# Patient Record
Sex: FEMALE | Race: WHITE | HISPANIC OR LATINO | Employment: UNEMPLOYED | URBAN - METROPOLITAN AREA
[De-identification: names, ages, dates, MRNs, and addresses within clinical notes are randomized per-mention and may not be internally consistent; named-entity substitution may affect disease eponyms.]

---

## 2017-05-24 ENCOUNTER — APPOINTMENT (OUTPATIENT)
Dept: RADIOLOGY | Facility: MEDICAL CENTER | Age: 27
End: 2017-05-24
Attending: EMERGENCY MEDICINE
Payer: COMMERCIAL

## 2017-05-24 ENCOUNTER — HOSPITAL ENCOUNTER (EMERGENCY)
Facility: MEDICAL CENTER | Age: 27
End: 2017-05-24
Attending: EMERGENCY MEDICINE
Payer: COMMERCIAL

## 2017-05-24 VITALS
DIASTOLIC BLOOD PRESSURE: 59 MMHG | BODY MASS INDEX: 26.88 KG/M2 | WEIGHT: 136.91 LBS | TEMPERATURE: 97.9 F | OXYGEN SATURATION: 98 % | RESPIRATION RATE: 18 BRPM | SYSTOLIC BLOOD PRESSURE: 109 MMHG | HEIGHT: 60 IN | HEART RATE: 68 BPM

## 2017-05-24 DIAGNOSIS — S16.1XXA CERVICAL STRAIN, INITIAL ENCOUNTER: ICD-10-CM
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
## 2017-05-24 DIAGNOSIS — S40.011A CONTUSION OF RIGHT SHOULDER, INITIAL ENCOUNTER: ICD-10-CM

## 2017-05-24 DIAGNOSIS — R07.89 CHEST WALL PAIN: ICD-10-CM

## 2017-05-24 PROCEDURE — 70450 CT HEAD/BRAIN W/O DYE: CPT

## 2017-05-24 PROCEDURE — 700102 HCHG RX REV CODE 250 W/ 637 OVERRIDE(OP): Performed by: EMERGENCY MEDICINE

## 2017-05-24 PROCEDURE — 700117 HCHG RX CONTRAST REV CODE 255: Performed by: EMERGENCY MEDICINE

## 2017-05-24 PROCEDURE — 99284 EMERGENCY DEPT VISIT MOD MDM: CPT

## 2017-05-24 PROCEDURE — 72125 CT NECK SPINE W/O DYE: CPT

## 2017-05-24 PROCEDURE — A9270 NON-COVERED ITEM OR SERVICE: HCPCS | Performed by: EMERGENCY MEDICINE

## 2017-05-24 PROCEDURE — 73030 X-RAY EXAM OF SHOULDER: CPT | Mod: RT

## 2017-05-24 PROCEDURE — 71260 CT THORAX DX C+: CPT

## 2017-05-24 RX ORDER — HYDROCODONE BITARTRATE AND ACETAMINOPHEN 5; 325 MG/1; MG/1
1 TABLET ORAL EVERY 4 HOURS PRN
Qty: 14 TAB | Refills: 0 | Status: SHIPPED | OUTPATIENT
Start: 2017-05-24

## 2017-05-24 RX ORDER — HYDROCODONE BITARTRATE AND ACETAMINOPHEN 10; 325 MG/1; MG/1
1 TABLET ORAL ONCE
Status: COMPLETED | OUTPATIENT
Start: 2017-05-24 | End: 2017-05-24

## 2017-05-24 RX ADMIN — HYDROCODONE BITARTRATE AND ACETAMINOPHEN 1 TABLET: 10; 325 TABLET ORAL at 19:21

## 2017-05-24 RX ADMIN — IOHEXOL 80 ML: 350 INJECTION, SOLUTION INTRAVENOUS at 20:21

## 2017-05-24 ASSESSMENT — PAIN SCALES - GENERAL
PAINLEVEL_OUTOF10: 7
PAINLEVEL_OUTOF10: 3

## 2017-05-24 NOTE — ED AVS SNAPSHOT
5/24/2017    Brandy Lewis  [place] [address]    Dear Brandy:    [facility] wants to ensure your discharge home is safe and you or your loved ones have had all of your questions answered regarding your care after you leave the hospital.    Below is a list of resources and contact information should you have any questions regarding your hospital stay, follow-up instructions, or active medical symptoms.    Questions or Concerns Regarding… Contact   Medical Questions Related to Your Discharge  (7 days a week, 8am-5pm) Contact a Nurse Care Coordinator   [phone]   Medical Questions Not Related to Your Discharge  (24 hours a day / 7 days a week)  Contact the Nurse Health Line   [phone]    Medications or Discharge Instructions Refer to your discharge packet   or contact your [facility] Primary Care Provider   [phone]   Follow-up Appointment(s) Schedule your appointment via [employer]   or contact Scheduling [phone]   Billing Review your statement via [employer]  or contact Billing [phone]   Medical Records Review your records via [employer]   or contact Medical Records [phone]     You may receive a telephone call within two days of discharge. This call is to make certain you understand your discharge instructions and have the opportunity to have any questions answered. You can also easily access your medical information, test results and upcoming appointments via the [employer] free online health management tool. You can learn more and sign up at [employer]. For assistance setting up your [employer] account, please call [phone].    Once again, we want to ensure your discharge home is safe and that you have a clear understanding of any next steps in your care. If you have any questions or concerns, please do not hesitate to contact us, we are here for you. Thank you for choosing [facility] for your healthcare needs.    Sincerely,    Your [facility] Healthcare Team

## 2017-05-24 NOTE — ED AVS SNAPSHOT
OneShield Access Code: [email or web address]  Expires: 6/23/2017  9:12 PM    Your email address is not on file at [facility].  Email Addresses are required for you to sign up for OneShield, please contact [phone] to verify your personal information and to provide your email address prior to attempting to register for OneShield.    Brandy Lewis  [facility] [place] [address]    OneShield  A secure, online tool to manage your health information     [facility]’s OneShield® is a secure, online tool that connects you to your personalized health information from the privacy of your home -- day or night - making it very easy for you to manage your healthcare. Once the activation process is completed, you can even access your medical information using the OneShield harpreet, which is available for free in the [employer] Harpreet store or Google Play store.     To learn more about OneShield, visit [email or web address]    There are two levels of access available (as shown below):   My Chart Features  [facility] Primary Care Doctor [facility]  Specialists [facility]  Urgent  Care Non-[facility] Primary Care Doctor   Email your healthcare team securely and privately 24/7 X X X    Manage appointments: schedule your next appointment; view details of past/upcoming appointments X      Request prescription refills. X      View recent personal medical records, including lab and immunizations X X X X   View health record, including health history, allergies, medications X X X X   Read reports about your outpatient visits, procedures, consult and ER notes X X X X   See your discharge summary, which is a recap of your hospital and/or ER visit that includes your diagnosis, lab results, and care plan X X  X     How to register for [employer]:  Once your e-mail address has been verified, follow the following steps to sign up for [employer].     1. Go to  [email or web address]  2. Click on the Sign Up Now box, which takes you to the New Member Sign Up page. You  will need to provide the following information:  a. Enter your [employer] Access Code exactly as it appears at the top of this page. (You will not need to use this code after you’ve completed the sign-up process. If you do not sign up before the expiration date, you must request a new code.)   b. Enter your date of birth.   c. Enter your home email address.   d. Click Submit, and follow the next screen’s instructions.  3. Create a [email or web address] ID. This will be your [employer] login ID and cannot be changed, so think of one that is secure and easy to remember.  4. Create a [employer] password. You can change your password at any time.  5. Enter your Password Reset Question and Answer. This can be used at a later time if you forget your password.   6. Enter your e-mail address. This allows you to receive e-mail notifications when new information is available in [employer].  7. Click Sign Up. You can now view your health information.    For assistance activating your [email or web address] account, call [phone]
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
## 2017-05-25 NOTE — DISCHARGE INSTRUCTIONS
Distensión cervical  (Cervical Sprain)  Jackeline distensión cervical es jackeline lesión en el sweta, en la que los tejidos obey y fibrosos (ligamentos) que unen los huesos del sweta, se distienden o se rompen. Jackeline distensión cervical puede ser desde muy leve a muy grave. En los casos graves pueden hacer que las vértebras del sweta se vuelvan inestables. [place] puede causar un daño en la médula sanders y puede josse lugar a graves problemas del sistema nervioso. La cantidad de tiempo que demora la mejoría de jackeline distensión cervical depende de la causa y de la extensión de la lesión. La mayoría de las veces se ramiro en 1 a 3 semanas.  CAUSAS   Las distensiones graves pueden ser causadas por:   · Lesiones por deportes de contacto (mayco en el fútbol americano, rugby, kristal, hockey, automovilismo, gimnasia, buceo, artes marciales y boxeo).  · Colisiones en vehículos de motor.  · Lesiones de latigazo cervical. Esta es jackeline lesión por movimiento brusco de adelante hacia atrás de la aris y el sweta.  · Caídas.  La causa de las distensiones cervicales leves pueden ser:   · Adoptar posiciones incómodas, mayco sostener el teléfono entre la oreja y el hombro.  · Sentarse en jackeline silla que no ofrece el soporte adecuado.  · Trabajar en jackeline toledo de computadora mal diseñada.  · Las actividades que requieren mirar hacia arriba o hacia abajo libby largos períodos.  SÍNTOMAS   · Dolor, sensibilidad, rigidez, o sensación de ardor en la parte anterior, posterior o lateral del sweta. [place] malestar puede aparecer inmediatamente después de la lesión o puede desarrollarse lentamente y no empezar hasta 24 horas o más después de la lesión.  · Dolor o sensibilidad que se siente directamente en la parte media posterior del sweta.  · Dolor en el hombro o la miranda superior de la espalda.  · Capacidad limitada para  el sweta.  · Dolor de aris.  · Mareos.  · Debilidad, entumecimiento u hormigueo en las honorio o los brazos.  · Espasmos  musculares.  · Dificultades para tragar o comer.  · Sensibilidad e hinchazón en el sweta.  DIAGNÓSTICO   La mayoría de las veces, el médico puede diagnosticar jin problema mediante la historia clínica y un examen físico. Manzo médico le preguntará acerca de lesiones previas y problemas conocidos mayco artritis en el sweta. Podrán tomarle radiografías para determinar si hay otros problemas, mayco enfermedades en los huesos del sweta. También puede ser necesario realizar otras pruebas, mayco tomografías computadas o resonancia magnética.   TRATAMIENTO   El tratamiento depende de la gravedad de la distensión. Las distensiones leves se pueden tratar con reposo, manteniendo el sweta en manzo lugar (inmovilización) y usando medicamentos para el dolor. Las distensiones graves deben ser inmediatamente inmovilizadas. Será necesario completar el tratamiento para aliviar el dolor, los espasmos musculares y otros síntomas, y puede incluir.  · Medicamentos mayco calmantes para el dolor, anestésicos o relajantes musculares.  · Fisioterapia. [place] puede incluir ejercicios de elongación, fortalecimiento y entrenamiento de la postura. Los ejercicios y jackeline mejor postura pueden ayudar a estabilizar el sweta, fortalecer los músculos y evitar que los síntomas vuelvan a aparecer.  INSTRUCCIONES PARA EL CUIDADO EN EL HOGAR   · Aplique hielo sobre la miranda lesionada.  ¨ Ponga el hielo en jackeline bolsa plástica.  ¨ Colóquese jackeline toalla entre la piel y la bolsa de hielo.  ¨ Deje el hielo libby 15 - 20 minutos y aplíquelo 3 - 4 veces por día.  · Si la lesión fue grave, le indicarán el uso de un collarín cervical. El collarín cervical es un collar de dos piezas para impedir que el sweta se mueva mientras se ramiro.  ¨ Nose quite el collarín excepto que se lo indique manzo médico.  ¨ Si tiene el gema leticia, manténgalo fuera del collarín.  ¨ Consulte a manzo médico antes de hacerle ajustes. Los ajustes menores pueden ser requeridos con el tiempo para  mejorar el confort y reducir la presión sobre la barbilla o en la parte posterior de la aris.  ¨ Si le permiten quitarse el collarín para lavarlo o darse un baño, siga las indicaciones de manzo médico acerca de cómo hacerlo con seguridad.  ¨ Mantenga el collarín limpio pasando un paño con agua y jabón y secándolo rachelle. Si el collarín tiene almohadillas removibles, quítelas cada 1-2 días para lavarlas a mano con agua y jabón. Deje que se sequen al aire. Debe secarlas rachelle antes de volver a colocarlas en el collarín.  ¨ Si le permiten quitarse el collarín para lavarlo y darse un baño, lave y seque la piel del sweta. Controle manzo piel para detectar irritación o llagas. Si las tiene, informe a manzo médico.  ¨ No conduzca vehículos mientras usa el collarín.  · Sólo tome medicamentos de venta pedro pablo o recetados para calmar el dolor, el malestar o bajar la fiebre, según las indicaciones de manzo médico.  · Cumpla con todas las visitas de control, según le indique manzo médico.  · Cumpla con todas las sesiones de fisioterapia, según le indique manzo médico.  · Francisco los ajustes necesarios en manzo lugar de trabajo para favorecer jackeline buena postura.  · Evite las posiciones y actividades que empeoran los síntomas.  · Francisco precalentamiento y elongue antes de comenzar jackeline actividad para evitar problemas.  SOLICITE ATENCIÓN MÉDICA SI:   · El dolor no se suraj con los medicamentos.  · No puede disminuir la dosis de analgésicos según lo planificado.  · Manzo nivel de actividad no mejora según lo esperado.  SOLICITE ATENCIÓN MÉDICA DE INMEDIATO SI:   · Presenta cualquier hemorragia.  · Siente malestar estomacal.  · Tiene signos de reacción alérgica a los medicamentos.  · Los síntomas empeoran.  · Le aparecen síntomas nuevos e inexplicables.  · Siente adormecimiento, hormigueo, debilidad o parálisis en alguna parte del cuerpo.  ASEGÚRESE DE QUE:   · Comprende estas instrucciones.  · Controlará manzo afección.  · Recibirá ayuda de inmediato si no mejora o  si empeora.     Esta información no tiene mayco fin reemplazar el consejo del médico. Asegúrese de hacerle al médico cualquier pregunta que tenga.     Document Released: 03/16/2010 Document Revised: 10/08/2014  Elsevier Interactive Patient Education ©2016 [employer]    Contusión  (Contusion)  Jackeline contusión es un hematoma profundo. Las contusiones son el resultado de jackeline lesión que causa sangrado debajo de la piel. La miranda de la contusión puede ponerse paige, morada o amarilla. Las lesiones menores causarán contusiones sin dolor, severiano las más graves pueden presentar dolor e inflamación libby un par de semanas.   CAUSAS   Generalmente, jackeline contusión se debe a un golpe, un traumatismo o jackeline fuerza directa en jackeline miranda del cuerpo.  SÍNTOMAS   · Hinchazón y enrojecimiento en la miranda de la lesión.  · Hematomas en la miranda de la lesión.  · Dolor con la palpación y sensibilidad en la miranda de la lesión.  · Dolor.  DIAGNÓSTICO   Se puede establecer el diagnóstico al hacer jackeline historia clínica y un examen físico. Agapito vez sea necesario hacer jackeline radiografía, jackeline tomografía computarizada o jackeline resonancia magnética para determinar si hay lesiones asociadas, mayco fracturas.  TRATAMIENTO   El tratamiento específico dependerá de la miranda del cuerpo donde se produjo la lesión. En general, el mejor tratamiento para jackeline contusión es el reposo, la aplicación de hielo, la elevación de la miranda y la aplicación de compresas frías en la miranda de la lesión. Para calmar el dolor también podrán recomendarle medicamentos de venta pedro pablo. Pregúntele al médico cuál es el mejor tratamiento para manzo contusión.  INSTRUCCIONES PARA EL CUIDADO EN EL HOGAR   · Aplique hielo sobre la miranda lesionada.  ¨ Ponga el hielo en jackeline bolsa plástica.  ¨ Colóquese jackeline toalla entre la piel y la bolsa de hielo.  ¨ Deje el hielo libby 15 a 20 minutos, 3 a 4 veces por día, o según las indicaciones del médico.  · Utilice los medicamentos de venta pedro pablo o recetados para  calmar el dolor, el malestar o la fiebre, según se lo indique el médico. El médico podrá indicarle que evite hardy antiinflamatorios (aspirina, ibuprofeno y naproxeno) libby 48 horas ya que estos medicamentos pueden aumentar los hematomas.  · Mantenga la miranda de la lesión en reposo.  · Si es posible, eleve la miranda de la lesión para reducir la hinchazón.  SOLICITE ATENCIÓN MÉDICA DE INMEDIATO SI:   · El hematoma o la hinchazón aumentan.  · Siente dolor que empeora.  · La hinchazón o el dolor no se alivian con los medicamentos.  ASEGÚRESE DE QUE:   · Comprende estas instrucciones.  · Controlará manzo afección.  · Recibirá ayuda de inmediato si no mejora o si empeora.     Esta información no tiene mayco fin reemplazar el consejo del médico. Asegúrese de hacerle al médico cualquier pregunta que tenga.     Document Released: 09/27/2006 Document Revised: 12/23/2014  [employer] Interactive Patient Education ©2016 [employer]    Colisión con un vehículo de motor  (Motor Vehicle Collision)  Después de sufrir un accidente automovilístico, es normal tener diversos hematomas y jaswinder musculares. Generalmente, estas molestias son peores libby las primeras 24 horas. En las primeras horas, probablemente sienta mayor entumecimiento y dolor. También puede sentirse peor al despertarse la mañana posterior a la colisión. A partir de allí, debería comenzar a mejorar día a día. La velocidad con que se mejora generalmente depende de la gravedad de la colisión y la cantidad, ubicación y naturaleza de las lesiones.  INSTRUCCIONES PARA EL CUIDADO EN EL HOGAR   · Aplique hielo sobre la miranda lesionada.  · Ponga el hielo en jackeline bolsa plástica.  · Colóquese jackeline toalla entre la piel y la bolsa de hielo.  · Deje el hielo libby 15 a 20 minutos, 3 a 4 veces por día, o según las indicaciones del médico.  · Clarice suficiente líquido para mantener la orina gennaro o de color amarillo pálido. No clarice alcohol.  · [place] jackeline ducha o un baño tibio jackeline o dos  veces al día. [place] aumentará el flujo de anamika hacia los músculos doloridos.  · Puede retomar ameena actividades normales cuando se lo indique el médico. Tenga cuidado al levantar objetos, ya que puede agravar el dolor en el sweta o en la espalda.  · Utilice los medicamentos de venta pedro pablo o recetados para calmar el dolor, el malestar o la fiebre, según se lo indique el médico. No tome aspirina. Puede aumentar los hematomas o la hemorragia.  SOLICITE ATENCIÓN MÉDICA DE INMEDIATO SI:  · Tiene entumecimiento, hormigueo o debilidad en los brazos o las piernas.  · Tiene dolor de aris intenso que no mejora con medicamentos.  · Siente un dolor intenso en el sweta, especialmente con la palpación en el centro de la espalda o el sweta.  · Disminuye manzo control de la vejiga o los intestinos.  · Aumenta el dolor en cualquier parte del cuerpo.  · Le falta el aire, tiene sensación de desvanecimiento, mareos o desmayos.  · Siente dolor en el pecho.  · Tiene malestar estomacal (náuseas), vómitos o sudoración.  · Cada vez siente más dolor abdominal.  · Observa anamika en la orina, en la materia fecal o en el vómito.  · Siente dolor en los hombros (en la miranad del cinturón de seguridad).  · Siente que los síntomas empeoran.  ASEGÚRESE DE QUE:   · Comprende estas instrucciones.  · Controlará manzo afección.  · Recibirá ayuda de inmediato si no mejora o si empeora.     Esta información no tiene mayco fin reemplazar el consejo del médico. Asegúrese de hacerle al médico cualquier pregunta que tenga.     Document Released: 09/27/2006 Document Revised: 01/08/2016  Elsevier Interactive Patient Education ©2016 [employer] Inc.    Distensión muscular.  (Muscle Strain)  Jackeline distensión muscular es jackeline lesión que se produce cuando un músculo se estira más allá de manzo leticia normal. Cuando esto sucede, por lo general se desgarra un pequeño número de fibras musculares. La distensión muscular se califica en grados. Las distensiones de primer frankie son  aquellas en las cuales el desgarro y el dolor afectan a la christina cantidad de fibras musculares. Las distensiones de claudia y tercer frankie involucran jackeline proporción cada vez mayor de desgarro y dolor.   En general, la recuperación de jackeline distensión muscular tarda de 1 a 2 semanas. La curación completa tarda de 5 a 6 semanas.   CAUSAS   Las distensiones musculares ocurren cuando se aplica jackeline fuerza violenta y repentina sobre un músculo y jin se estira demasiado. [place] puede ocurrir cuando se levantan objetos, se practican deportes o en jackeline caída.   FACTORES DE RIESGO  La distensión muscular es especialmente común en los atletas.   SIGNOS Y SÍNTOMAS  En el lugar de la distensión muscular se puede presentar lo siguiente:  · Dolor.  · Moretones.  · Hinchazón.  · Dificultad para usar el músculo debido al dolor o a un funcionamiento anormal.  DIAGNÓSTICO   El médico le hará un examen físico y le hará preguntas sobre ameena antecedentes médicos.  TRATAMIENTO   Con frecuencia, el mejor tratamiento para jackeline distensión muscular es el reposo, y la aplicación de hielo y de compresas frías en la miranda de la lesión.   INSTRUCCIONES PARA EL CUIDADO EN EL HOGAR   · Use el método PRICE (por ameena siglas en inglés) de tratamiento para estimular la curación libby los primeros 2 a 3 días posteriores a la lesión. El método PRICE implica lo siguiente:  ¨ Proteger al músculo de nuevas lesiones.  ¨ Limitar la actividad y descansar la parte del cuerpo lesionada.  ¨ Aplicar hielo a la lesión. Para hacerlo, ponga hielo en jackeline bolsa plástica. Coloque jackeline toalla entre la piel y la bolsa de hielo. Luego aplique el hielo y déjelo actuar de 15 a 20 minutos por hora. Después del tercer día, cambie a compresas de calor húmedo.  ¨ Comprimir la miranda lesionada con jackeline férula o venda elástica. Tenga cuidado de no ajustarla demasiado. [place] puede interferir con la circulación sanguínea o aumentar la hinchazón.  ¨ Mantener la miranda lesionada por encima del  nivel del corazón con la mayor frecuencia posible.  · Utilice los medicamentos de venta pedro pablo o recetados para calmar el dolor, el malestar o la fiebre, según se lo indique el médico.  · Realizar un calentamiento antes de hacer ejercicio ayuda a prevenir distensiones musculares futuras.  SOLICITE ATENCIÓN MÉDICA SI:   · Siente un dolor cada vez más intenso o hinchazón en la miranda lesionada.  · Siente adormecimiento, hormigueo o nota jackeline pérdida importante de fuerza en la miranda lesionada.  ASEGÚRESE DE QUE:   · Comprende estas instrucciones.  · Controlará manzo afección.  · Recibirá ayuda de inmediato si no mejora o si empeora.     Esta información no tiene mayco fin reemplazar el consejo del médico. Asegúrese de hacerle al médico cualquier pregunta que tenga.     Document Released: 09/27/2006 Document Revised: 10/08/2014  Elsevier Interactive Patient Education ©2016 [employer] Inc.

## 2017-05-25 NOTE — ED NOTES
Discharge orders received, IV and monitor discontinued, instructions and education given, follow-up discussed, pt verbalized understanding.  D/C instructions printed in Frisian,  translated instructions.
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
## 2018-03-26 ENCOUNTER — HOSPITAL ENCOUNTER (OUTPATIENT)
Dept: RADIOLOGY | Facility: MEDICAL CENTER | Age: 28
End: 2018-03-26
Attending: INTERNAL MEDICINE
Payer: COMMERCIAL

## 2018-03-26 DIAGNOSIS — R10.31 ABDOMINAL PAIN, RIGHT LOWER QUADRANT: ICD-10-CM

## 2018-03-26 PROCEDURE — 700117 HCHG RX CONTRAST REV CODE 255: Performed by: INTERNAL MEDICINE

## 2018-03-26 PROCEDURE — 74177 CT ABD & PELVIS W/CONTRAST: CPT

## 2018-03-26 RX ADMIN — IOHEXOL 50 ML: 240 INJECTION, SOLUTION INTRATHECAL; INTRAVASCULAR; INTRAVENOUS; ORAL at 09:55

## 2018-03-26 RX ADMIN — IOHEXOL 100 ML: 350 INJECTION, SOLUTION INTRAVENOUS at 09:55

## 2019-04-21 ENCOUNTER — OFFICE VISIT (OUTPATIENT)
Dept: URGENT CARE | Facility: PHYSICIAN GROUP | Age: 29
End: 2019-04-21
Payer: COMMERCIAL

## 2019-04-21 VITALS
HEIGHT: 60 IN | BODY MASS INDEX: 26.55 KG/M2 | TEMPERATURE: 98.5 F | HEART RATE: 76 BPM | SYSTOLIC BLOOD PRESSURE: 108 MMHG | OXYGEN SATURATION: 97 % | WEIGHT: 135.2 LBS | DIASTOLIC BLOOD PRESSURE: 60 MMHG

## 2019-04-21 DIAGNOSIS — M94.0 COSTOCHONDRITIS, ACUTE: ICD-10-CM

## 2019-04-21 DIAGNOSIS — R07.9 CHEST PAIN AT REST: ICD-10-CM

## 2019-04-21 DIAGNOSIS — R06.02 SHORTNESS OF BREATH: ICD-10-CM

## 2019-04-21 PROCEDURE — 93000 ELECTROCARDIOGRAM COMPLETE: CPT | Performed by: NURSE PRACTITIONER

## 2019-04-21 PROCEDURE — 99202 OFFICE O/P NEW SF 15 MIN: CPT | Performed by: NURSE PRACTITIONER

## 2019-04-21 ASSESSMENT — ENCOUNTER SYMPTOMS
FEVER: 0
MYALGIAS: 0
NAUSEA: 1
COUGH: 0
HEADACHES: 0
DIZZINESS: 0
DIAPHORESIS: 0
SHORTNESS OF BREATH: 1
WEAKNESS: 0

## 2019-04-21 NOTE — PROGRESS NOTES
Subjective:      Brandy Lewis is a 29 y.o. female who presents with Chest Pain (dull chest pain, when moving sharp pains begin, dizziness, onset at 4am )            HPI New.29 year old female with chest pain that started at 0400 this morning. She describes this as sharp,worse with movement and deep breath. She has some nausea. Denies fever, chills, cough or congestion. She has not taken any medication for this issue. No family history of early cardiac disease.  Nkda [no known drug allergy]  Current Outpatient Prescriptions on File Prior to Visit   Medication Sig Dispense Refill   • levothyroxine (SYNTHROID) 50 MCG TABS Take 50 mcg by mouth Every morning on an empty stomach.     • hydrocodone-acetaminophen (NORCO) 5-325 MG Tab per tablet Take 1 Tab by mouth every four hours as needed. 14 Tab 0   • Prenat w/o A-FE-DSS-Methfol-FA (PRENATAL MULTIVITAMIN) -400 MG-MCG-MCG tablet Take 1 Tab by mouth every day. 100 Each 3     No current facility-administered medications on file prior to visit.      Social History     Social History   • Marital status:      Spouse name: N/A   • Number of children: N/A   • Years of education: N/A     Occupational History   • Not on file.     Social History Main Topics   • Smoking status: Never Smoker   • Smokeless tobacco: Never Used   • Alcohol use No      Comment: none   • Drug use: No      Comment: none   • Sexual activity: Yes     Partners: Male      Comment: none, planned and desired, same FOB involved and supportive     Other Topics Concern   • Not on file     Social History Narrative   • No narrative on file     family history includes Cancer in her paternal grandfather; Diabetes in her paternal grandmother.      Review of Systems   Constitutional: Negative for diaphoresis, fever and malaise/fatigue.   HENT: Negative for congestion.    Respiratory: Positive for shortness of breath. Negative for cough.    Cardiovascular: Positive for chest pain.   Gastrointestinal:  Positive for nausea.   Musculoskeletal: Negative for myalgias.   Neurological: Negative for dizziness, weakness and headaches.          Objective:     /60 (BP Location: Right arm, Patient Position: Sitting, BP Cuff Size: Adult)   Pulse 76   Temp 36.9 °C (98.5 °F) (Temporal)   Ht 1.524 m (5')   Wt 61.3 kg (135 lb 3.2 oz)   SpO2 97%   BMI 26.40 kg/m²      Physical Exam   Constitutional: She is oriented to person, place, and time. She appears well-developed and well-nourished. No distress.   HENT:   Head: Normocephalic and atraumatic.   Right Ear: External ear and ear canal normal. Tympanic membrane is not injected and not perforated. No middle ear effusion.   Left Ear: External ear and ear canal normal. Tympanic membrane is not injected and not perforated.  No middle ear effusion.   Nose: No mucosal edema.   Mouth/Throat: No oropharyngeal exudate or posterior oropharyngeal erythema.   Eyes: Conjunctivae are normal. Right eye exhibits no discharge. Left eye exhibits no discharge.   Neck: Normal range of motion. Neck supple.   Cardiovascular: Normal rate, regular rhythm and normal heart sounds.    No murmur heard.  Pulmonary/Chest: Effort normal and breath sounds normal. No respiratory distress. She exhibits tenderness.   Musculoskeletal: Normal range of motion.   Normal movement of all 4 extremities.   Lymphadenopathy:     She has no cervical adenopathy.        Right: No supraclavicular adenopathy present.        Left: No supraclavicular adenopathy present.   Neurological: She is alert and oriented to person, place, and time. Gait normal.   Skin: Skin is warm and dry.   Psychiatric: She has a normal mood and affect. Her behavior is normal. Thought content normal.   Nursing note and vitals reviewed.              Assessment/Plan:     1. Costochondritis, acute     2. Chest pain at rest  EKG - Clinic Performed   3. Shortness of breath         ekg with nsr, rate of 70, no ectopy; no ST deviation.  Reassurance to  patient on this. Likely MSK in etiology. nsaids and icng.  Differential diagnosis, natural history, supportive care, and indications for immediate follow-up discussed at length.

## 2019-08-07 ENCOUNTER — OFFICE VISIT (OUTPATIENT)
Dept: URGENT CARE | Facility: CLINIC | Age: 29
End: 2019-08-07
Payer: COMMERCIAL

## 2019-08-07 VITALS
HEART RATE: 78 BPM | DIASTOLIC BLOOD PRESSURE: 60 MMHG | OXYGEN SATURATION: 98 % | WEIGHT: 139.6 LBS | RESPIRATION RATE: 16 BRPM | HEIGHT: 60 IN | BODY MASS INDEX: 27.41 KG/M2 | TEMPERATURE: 97.9 F | SYSTOLIC BLOOD PRESSURE: 108 MMHG

## 2019-08-07 DIAGNOSIS — S61.011A LACERATION OF RIGHT THUMB WITHOUT FOREIGN BODY WITHOUT DAMAGE TO NAIL, INITIAL ENCOUNTER: ICD-10-CM

## 2019-08-07 PROCEDURE — 90471 IMMUNIZATION ADMIN: CPT | Performed by: NURSE PRACTITIONER

## 2019-08-07 PROCEDURE — 99214 OFFICE O/P EST MOD 30 MIN: CPT | Mod: 25 | Performed by: NURSE PRACTITIONER

## 2019-08-07 PROCEDURE — 90715 TDAP VACCINE 7 YRS/> IM: CPT | Performed by: NURSE PRACTITIONER

## 2019-08-07 RX ORDER — AMOXICILLIN 500 MG/1
500 CAPSULE ORAL 2 TIMES DAILY
Qty: 14 CAP | Refills: 0 | Status: SHIPPED | OUTPATIENT
Start: 2019-08-07 | End: 2019-08-14

## 2019-08-07 ASSESSMENT — ENCOUNTER SYMPTOMS
FEVER: 0
TINGLING: 1
WEAKNESS: 0
SENSORY CHANGE: 1
CHILLS: 0
BRUISES/BLEEDS EASILY: 0
MYALGIAS: 0

## 2019-08-08 NOTE — PROGRESS NOTES
Subjective:      Brandy Lewis is a 29 y.o. female who presents with Laceration (right thumb cut,x today )            HPI  Laceration to left thumb from cutting potatoes with kitchen knife. No active bleeding. Has not cleaned wound yet. Denies pain, numbness/tingling. Unknown tetanus status. Right handed.    PMH:  has a past medical history of Thyroid disease (11/2012). She also has no past medical history of Addisons disease (HCC), Adrenal disorder (HCC), Allergy, Anemia, Anxiety, Blood transfusion, Clotting disorder (HCC), COPD, Cushings syndrome (HCC), Diabetic neuropathy (HCC), GERD (gastroesophageal reflux disease), Goiter, Hyperlipidemia, IBD (inflammatory bowel disease), Meningitis, Migraine, Muscle disorder, OSTEOPOROSIS, Parathyroid disorder (HCC), Pituitary disease (HCC), Sickle cell disease (HCC), Substance abuse (HCC), or Ulcer.  MEDS:   Current Outpatient Medications:   •  amoxicillin (AMOXIL) 500 MG Cap, Take 1 Cap by mouth 2 times a day for 7 days., Disp: 14 Cap, Rfl: 0  •  hydrocodone-acetaminophen (NORCO) 5-325 MG Tab per tablet, Take 1 Tab by mouth every four hours as needed., Disp: 14 Tab, Rfl: 0  •  levothyroxine (SYNTHROID) 50 MCG TABS, Take 50 mcg by mouth Every morning on an empty stomach., Disp: , Rfl:   •  Prenat w/o A-FE-DSS-Methfol-FA (PRENATAL MULTIVITAMIN) -400 MG-MCG-MCG tablet, Take 1 Tab by mouth every day., Disp: 100 Each, Rfl: 3  ALLERGIES:   Allergies   Allergen Reactions   • Nkda [No Known Drug Allergy]      SURGHX:   Past Surgical History:   Procedure Laterality Date   • HYSTEROSCOPY NOVASURE-2  9/6/2016    Procedure: HYSTEROSCOPY NOVASURE - ENDOMETRIAL ABLATION;  Surgeon: Krish Prather M.D.;  Location: SURGERY SAME DAY [facility];  Service:    • TUBAL COAGULATION LAPAROSCOPIC BILATERAL Bilateral 9/6/2016    Procedure: TUBAL COAGULATION LAPAROSCOPIC BILATERAL;  Surgeon: Krish Prather M.D.;  Location: SURGERY SAME DAY [facility];  Service:    • ANTERIOR AND  POSTERIOR REPAIR  9/6/2016    Procedure: ANTERIOR AND POSTERIOR REPAIR W/PERINEOPLASTY;  Surgeon: Krish Prather M.D.;  Location: SURGERY SAME DAY [facility];  Service:    • ENTEROCELE REPAIR  9/6/2016    Procedure: ENTEROCELE REPAIR;  Surgeon: Krish Prather M.D.;  Location: SURGERY SAME DAY [facility];  Service:    • BLADDER SLING FEMALE  9/6/2016    Procedure: BLADDER SLING FEMALE - TOT;  Surgeon: Krish Prather M.D.;  Location: SURGERY SAME DAY [facility];  Service:    • VAGINAL SUSPENSION  9/6/2016    Procedure: VAGINAL SUSPENSION - POSS SACROSPINOUS VAULT SUSPENSION;  Surgeon: Krish Prather M.D.;  Location: SURGERY SAME DAY [facility];  Service:    • CHOLECYSTECTOMY  2011     SOCHX:  reports that she has never smoked. She has never used smokeless tobacco. She reports that she does not drink alcohol or use drugs.  FH: Family history was reviewed, no pertinent findings to report    Review of Systems   Constitutional: Negative for chills, fever and malaise/fatigue.   Musculoskeletal: Negative for joint pain and myalgias.   Skin: Negative for itching and rash.   Neurological: Positive for tingling and sensory change. Negative for weakness.   Endo/Heme/Allergies: Does not bruise/bleed easily.   All other systems reviewed and are negative.         Objective:     /60   Pulse 78   Temp 36.6 °C (97.9 °F) (Temporal)   Resp 16   Ht 1.524 m (5')   Wt 63.3 kg (139 lb 9.6 oz)   SpO2 98%   BMI 27.26 kg/m²      Physical Exam   Constitutional: She is oriented to person, place, and time. Vital signs are normal. She appears well-developed and well-nourished. She is active and cooperative.  Non-toxic appearance. She does not have a sickly appearance. She does not appear ill. No distress.   HENT:   Head: Normocephalic.   Cardiovascular: Normal rate.   Pulmonary/Chest: Effort normal. No accessory muscle usage. No respiratory distress.   Musculoskeletal: Normal range of motion. She exhibits no edema or  "deformity.        Right hand: She exhibits tenderness and laceration. She exhibits normal range of motion, no bony tenderness, normal two-point discrimination, normal capillary refill, no deformity and no swelling. Normal sensation noted. Normal strength noted.   Neurological: She is alert and oriented to person, place, and time.   Skin: Skin is warm and dry. Laceration noted. No abrasion, no bruising and no ecchymosis noted. She is not diaphoretic. No erythema.   Small superficial avulsion to adjacent 1st joint. No active bleeding. FROM right thumb but with discomfort. Skin pw//d, skin sensation intact. Procedure:  -Dilute hibiclens and saline  -Applied Dermabond to avulsion site  -Applied #4 1/8\" steri-strips to skin flap  -Nonadhering bandage, coban, finger splint   Vitals reviewed.              Assessment/Plan:     1. Laceration of right thumb without foreign body without damage to nail, initial encounter    - amoxicillin (AMOXIL) 500 MG Cap; Take 1 Cap by mouth 2 times a day for 7 days.  Dispense: 14 Cap; Refill: 0  - Tdap =>8yo IM      May use tylenol or ibuprofen prn for discomfort  Keep bandage clean and dry, remove and change daily, if soiled or wet remove and replace  Try not to use right hand x 3 days until approximation is stable, may remove finger splint daily to inspect and clean wound  Mild soap and water, do not scrub, pat dry  No TAB ointment on steri-strips  May use ice for any swelling or discomfort  Monitor for increased swelling, redness, d/c, pain and increased heat to site- need re-evaluation    "

## 2022-05-27 ENCOUNTER — HOSPITAL ENCOUNTER (OUTPATIENT)
Dept: RADIOLOGY | Facility: MEDICAL CENTER | Age: 32
End: 2022-05-27
Attending: FAMILY MEDICINE
Payer: COMMERCIAL

## 2022-05-27 DIAGNOSIS — R29.810 FACIAL WEAKNESS: ICD-10-CM

## 2022-05-27 PROCEDURE — A9576 INJ PROHANCE MULTIPACK: HCPCS | Performed by: FAMILY MEDICINE

## 2022-05-27 PROCEDURE — 70553 MRI BRAIN STEM W/O & W/DYE: CPT

## 2022-05-27 PROCEDURE — 700117 HCHG RX CONTRAST REV CODE 255: Performed by: FAMILY MEDICINE

## 2022-05-27 RX ADMIN — GADOTERIDOL 10 ML: 279.3 INJECTION, SOLUTION INTRAVENOUS at 15:07

## 2022-12-19 ENCOUNTER — APPOINTMENT (OUTPATIENT)
Dept: PHYSICAL MEDICINE AND REHAB | Facility: MEDICAL CENTER | Age: 32
End: 2022-12-19
Payer: COMMERCIAL

## 2022-12-19 NOTE — PROGRESS NOTES
"New Patient Note    Interventional Pain and Spine  Physiatry (Physical Medicine and Rehabilitation)     Patient Name: Brandy Chang  : 1990  Date of Service: 2022  PCP: Adore Paniagua M.D.  Referring Provider: Adore Paniagua M.D.     services were used in the patient's primary language of Yi.     Name or Number: ***  Mode of interpretation: { Mode:37831}    Content of Interpretation:  ***        Chief Complaint: No chief complaint on file.      HPI  HISTORY (2022):  Brandy Chang is a 32 y.o. female who presents today with ***.    This pain began ***. Pain right now is ***/10 on the numeric pain scale. Her pain at its best-worse level during the course of the day is typically ***-***/10, respectively.  Pain worsens with {painaffectedby:66078} and improves with {painaffectedby:27695}. Her pain {paininterferewithadls:72694}. The patient {suredfla::\"otherwise denies new weakness, numbness, or bladder/bowel incontinence\"}. {}    Cervicalgia    neck pain right upper extremity pain for 5 months duration.  Right upper extremity pain is fairly diffuse in nature.  Associated with intermittent numbness and weakness.  Denies balance problems and fine motor skills.  She started physical therapy.  She has not had any injections.  She does take NSAIDs.     Pain Assessment  Pain Assessment: 0-10  Pain Score: 8      neck pain right upper extremity pain.  Discussed with the patient due to the absence of any pathology on the MRI did not recommend any surgical intervention.  I discussed that she should continue with physical therapy for her axial neck pain which may be consistent with a muscle strain.  I also offered the patient referral to pain management for evaluation treatment injections as needed.  Diagnosis:  1. Cervical radiculopathy  Referral to Physiatry (PMR)             Plan: Pain management referral follow-up as needed      The patient " {[facility]:88599} done physical therapy for this problem, most recently ***.     Patient has tried the following medications with varied success (current meds in bold): ***    Therapeutic modalities and interventional therapies to date include:  -***    Medical history includes ***.    ***ckphq    Medical records review:  I reviewed the note from the referring provider Adore Paniagua M.D. including the note dated ***.    And note from Dr. Hughes 8/30/22    ROS:   Red Flags ROS:   Fever, Chills, Sweats: Denies  Involuntary Weight Loss: Denies  Bladder Incontinence: Denies  Bowel Incontinence: denies  Saddle Anesthesia: Denies    All other systems reviewed and negative.     PMHx:   Past Medical History:   Diagnosis Date    Thyroid disease 11/2012    Pt taking levothyroxine       PSHx:   Past Surgical History:   Procedure Laterality Date    HYSTEROSCOPY NOVASURE-2  9/6/2016    Procedure: HYSTEROSCOPY NOVASURE - ENDOMETRIAL ABLATION;  Surgeon: Krish Prather M.D.;  Location: SURGERY SAME DAY [facility];  Service:     TUBAL COAGULATION LAPAROSCOPIC BILATERAL Bilateral 9/6/2016    Procedure: TUBAL COAGULATION LAPAROSCOPIC BILATERAL;  Surgeon: Krish Prather M.D.;  Location: SURGERY SAME DAY [facility];  Service:     ANTERIOR AND POSTERIOR REPAIR  9/6/2016    Procedure: ANTERIOR AND POSTERIOR REPAIR W/PERINEOPLASTY;  Surgeon: Krish Prather M.D.;  Location: SURGERY SAME DAY [facility];  Service:     ENTEROCELE REPAIR  9/6/2016    Procedure: ENTEROCELE REPAIR;  Surgeon: Krish Prather M.D.;  Location: SURGERY SAME DAY [facility];  Service:     BLADDER SLING FEMALE  9/6/2016    Procedure: BLADDER SLING FEMALE - TOT;  Surgeon: Krish Prather M.D.;  Location: SURGERY SAME DAY [facility];  Service:     VAGINAL SUSPENSION  9/6/2016    Procedure: VAGINAL SUSPENSION - POSS SACROSPINOUS VAULT SUSPENSION;  Surgeon: Krish Prather M.D.;  Location: SURGERY SAME DAY [facility];  Service:     CHOLECYSTECTOMY  2011        Family Hx:   Family History   Problem Relation Age of Onset    Diabetes Paternal Grandmother     Cancer Paternal Grandfather         prostate cance       Social Hx:  Social History     Socioeconomic History    Marital status:      Spouse name: Not on file    Number of children: Not on file    Years of education: Not on file    Highest education level: Not on file   Occupational History    Not on file   Tobacco Use    Smoking status: Never    Smokeless tobacco: Never   Substance and Sexual Activity    Alcohol use: No     Alcohol/week: 0.0 oz     Comment: none    Drug use: No     Comment: none    Sexual activity: Yes     Partners: Male     Comment: none, planned and desired, same FOB involved and supportive   Other Topics Concern    Not on file   Social History Narrative    Not on file     Social Determinants of Health     Financial Resource Strain: Not on file   Food Insecurity: Not on file   Transportation Needs: Not on file   Physical Activity: Not on file   Stress: Not on file   Social Connections: Not on file   Intimate Partner Violence: Not on file   Housing Stability: Not on file       Allergies:  Allergies   Allergen Reactions    Nkda [No Known Drug Allergy]        Medications: reviewed on epic.   No outpatient medications have been marked as taking for the 12/19/22 encounter (Appointment) with Loretta Carrizales M.D..        Current Outpatient Medications on File Prior to Visit   Medication Sig Dispense Refill    methylPREDNISolone (MEDROL DOSEPAK) 4 MG Tablet Therapy Pack Follow schedule on package instructions. 21 Tablet 0    hydrocodone-acetaminophen (NORCO) 5-325 MG Tab per tablet Take 1 Tab by mouth every four hours as needed. 14 Tab 0    levothyroxine (SYNTHROID) 50 MCG TABS Take 50 mcg by mouth Every morning on an empty stomach.      Prenat w/o A-FE-DSS-Methfol-FA (PRENATAL MULTIVITAMIN) -400 MG-MCG-MCG tablet Take 1 Tab by mouth every day. 100 Each 3     No current  "facility-administered medications on file prior to visit.         EXAMINATION     Physical Exam:   There were no vitals taken for this visit.    Constitutional:   Body Habitus: There is no height or weight on file to calculate BMI.  Cooperation: Fully cooperates with exam  Appearance: Well-groomed, well-nourished.    Eyes: No scleral icterus to suggest severe liver disease, no proptosis to suggest severe hyperthyroidism    ENT -no obvious auditory deficits, no noticeable facial droop     Skin -no rashes or lesions noted     Respiratory-  breathing comfortably on room air, no audible wheezing    Cardiovascular-distal extremities warm and well perfused.  No lower extremity edema is noted.     Gastrointestinal - no obvious abdominal masses, non-distended    Psychiatric- alert and oriented ×3. Normal affect.     Gait - normal gait, no use of ambulatory device, nonantalgic. ***Heel walking and toe walking intact.    Musculoskeletal and Neuro -     Cervical spine   Inspection: No deformities of the skin over the cervical spine. No rashes or lesions.    {surom:97244} active range of motion in all directions    Spurling's sign  {suprovocativetests:67872::\"negative bilaterally\"}  Cervical facet loading maneuver  {suprovocativetests:84860::\"negative bilaterally\"}    No*** signs of muscular atrophy in bilateral upper extremities     Tenderness to palpation at {cervicalttp:48425}. No tenderness to palpation elsewhere including {cervicalttp:00625}.      Key points for the international standards for neurological classification of spinal cord injury (ISNCSCI) to light touch.     Dermatome R L   C4 2 2   C5 2 2   C6 2 2   C7 2 2   C8 2 2   T1 2 2   T2 2 2       Motor Exam Upper Extremities   ? Myotome R L   Shoulder abduction C5 5 5   Elbow flexion C5 5 5   Wrist extension C6 5 5   Elbow extension C7 5 5   Finger flexion C8 5 5   Finger abduction T1 5 5     Reflexes  ?  R L   Biceps  2+ 2+   Brachioradialis  2+ 2+     Moody's " "sign {ck r/l positive:65768}            Thoracic/Lumbar Spine/Sacral Spine/Hips   Inspection: No*** evidence of atrophy in bilateral lower extremities throughout     ROM: {surom:83694} active range of motion with lumbar flexion, lateral flexion, and rotation bilaterally.   There is {surom:88542} active range of motion with lumbar extension    Facet loading maneuver {suprovocativetests:25921::\"negative bilaterally\"}    Palpation:   Tenderness to palpation over the {lumbarttp:17339}. No tenderness to palpation elsewhere in the low back/hips including {lumbarttp:02600}.    Lumbar spine /hip provocative exam maneuvers  Straight leg raise {suprovocativetests:25263::\"negative bilaterally\"}  Slump-sit test {suprovocativetests:82839::\"negative bilaterally\"}  FADIR test {suprovocativetests:07536::\"negative bilaterally\"}    SI joint tests  ALLEN test {suprovocativetests:65612::\"negative bilaterally\"}  Thigh thrust test {suprovocativetests:40860::\"negative bilaterally\"}  SI joint compression {suprovocativetests:44282::\"negative bilaterally\"}  SI joint distraction {suprovocativetests:42365::\"negative bilaterally\"}  Sacral thrust test {suprovocativetests:60688::\"negative bilaterally\"}  Yeomans maneuver {suprovocativetests:07372::\"negative bilaterally\"}  Davon finger sign {suprovocativetests:82011::\"negative bilaterally\"}      Key points for the international standards for neurological classification of spinal cord injury (ISNCSCI) to light touch.   Dermatome R L   L2 2 2   L3 2 2   L4 2 2   L5 2 2   S1 2 2   S2 2 2       Motor Exam Lower Extremities  ? Myotome R L   Hip flexion L2 5 5   Knee extension L3 5 5   Ankle dorsiflexion L4 5 5   Toe extension L5 5 5   Ankle plantarflexion S1 5 5       Reflexes  ?  R L   Patella  2+ 2+   Achilles   2+ 2+     Clonus of the ankle {ck r/l positive:21758}       MEDICAL DECISION MAKING    Medical records review: see under HPI section.     DATA    Labs: ***  Lab Results   Component Value " Date/Time    SODIUM 139 2011 12:43 AM    POTASSIUM 3.3 (L) 2011 12:43 AM    CHLORIDE 107 2011 12:43 AM    CO2 22 2011 12:43 AM    ANION 10.0 2011 12:43 AM    GLUCOSE 93 2011 12:43 AM    BUN 14 2011 12:43 AM    CREATININE 0.83 2011 12:43 AM    CALCIUM 8.9 2011 12:43 AM    ASTSGOT 72 (H) 2011 12:43 AM    ALTSGPT 62 (H) 2011 12:43 AM    TBILIRUBIN 0.8 2011 12:43 AM    ALBUMIN 3.9 2011 12:43 AM    TOTPROTEIN 7.3 2011 12:43 AM    GLOBULIN 3.4 2011 12:43 AM    AGRATIO 1.1 2011 12:43 AM       No results found for: PROTHROMBTM, INR     Lab Results   Component Value Date/Time    WBC 12.3 (H) 2016 10:22 AM    RBC 4.02 (L) 2016 10:22 AM    HEMOGLOBIN 12.7 2016 10:22 AM    HEMATOCRIT 37.3 2016 10:22 AM    MCV 92.8 2016 10:22 AM    MCH 31.6 2016 10:22 AM    MCHC 34.0 2016 10:22 AM    MPV 10.7 2016 10:22 AM    NEUTSPOLYS 65.5 2014 05:00 AM    LYMPHOCYTES 26.7 2014 05:00 AM    MONOCYTES 6.5 2014 05:00 AM    EOSINOPHILS 0.8 2014 05:00 AM    BASOPHILS 0.5 2014 05:00 AM        No results found for: HBA1C     Imaging:   I personally reviewed following images, these are my reads  ***        IMAGING radiology reads. I reviewed the following radiology reads     Results for orders placed during the hospital encounter of 22    MR-BRAIN-WITH & W/O    Impression  MRI of the brain without and with contrast within normal limits.                                                               Results for orders placed in visit on 22    DX-CERVICAL SPINE-4+ VIEWS                                   Results for orders placed in visit on 22    DX-SHOULDER 2+ RIGHT              Diagnosis  {No diagnosis found. (Refresh or delete this SmartLink)}      ASSESSMENT AND PLAN:  Brandy Chang ( 1990) is a female with history of *** who presents with ***  suggestive of ***.     There are no diagnoses linked to this encounter.      PLAN  Physical Therapy: I ordered physical therapy to focus on strengthening and stretching as well as a home exercise program. ***    Home Exercise Program: I provided the patient with a home exercise program focusing on strengthening and stretching.     Diagnostic workup: {suapimagin}    Medications:   - {sumeds:23571}   -  reviewed, records do not demonstrate increased risk of opioid abuse.    Interventions:   ***     Referrals:   ***    Outside records requested:  The patient signed outside records request form for her outside records including outside images. This includes the records from {surequestrec:02404}    Follow-up: ***    No orders of the defined types were placed in this encounter.      Loretta Carrizales MD  Interventional Pain and Spine  Physical Medicine and Rehabilitation  [facility]     Adore Paniagua M.D.     The above note documents my personal evaluation of this patient. In addition, I have reviewed and confirmed with the patient and MA the supportive information documented in today's Patient Health Questionnaire and Office Note.     Please note that this dictation was created using voice recognition software. I have made every reasonable attempt to correct obvious errors, but I expect that there are errors of grammar and possibly content that I did not discover before finalizing the note.

## 2024-07-06 ENCOUNTER — OFFICE VISIT (OUTPATIENT)
Dept: URGENT CARE | Facility: PHYSICIAN GROUP | Age: 34
End: 2024-07-06
Payer: COMMERCIAL

## 2024-07-06 VITALS
WEIGHT: 122.4 LBS | DIASTOLIC BLOOD PRESSURE: 66 MMHG | BODY MASS INDEX: 24.03 KG/M2 | SYSTOLIC BLOOD PRESSURE: 114 MMHG | TEMPERATURE: 98.4 F | HEIGHT: 60 IN | HEART RATE: 82 BPM | OXYGEN SATURATION: 98 % | RESPIRATION RATE: 18 BRPM

## 2024-07-06 DIAGNOSIS — M62.830 MUSCLE SPASM OF BACK: ICD-10-CM

## 2024-07-06 PROCEDURE — 99203 OFFICE O/P NEW LOW 30 MIN: CPT | Performed by: FAMILY MEDICINE

## 2024-07-06 PROCEDURE — 3074F SYST BP LT 130 MM HG: CPT | Performed by: FAMILY MEDICINE

## 2024-07-06 PROCEDURE — 3078F DIAST BP <80 MM HG: CPT | Performed by: FAMILY MEDICINE

## 2024-07-06 RX ORDER — LEVOTHYROXINE SODIUM 88 UG/1
TABLET ORAL
COMMUNITY

## 2024-07-06 RX ORDER — CYCLOBENZAPRINE HCL 5 MG
5 TABLET ORAL 2 TIMES DAILY PRN
Qty: 10 TABLET | Refills: 0 | Status: SHIPPED | OUTPATIENT
Start: 2024-07-06

## 2024-10-10 ENCOUNTER — OFFICE VISIT (OUTPATIENT)
Dept: URGENT CARE | Facility: CLINIC | Age: 34
End: 2024-10-10
Payer: COMMERCIAL

## 2024-10-10 VITALS
HEART RATE: 76 BPM | DIASTOLIC BLOOD PRESSURE: 66 MMHG | OXYGEN SATURATION: 100 % | SYSTOLIC BLOOD PRESSURE: 106 MMHG | RESPIRATION RATE: 20 BRPM | BODY MASS INDEX: 22.56 KG/M2 | TEMPERATURE: 97.3 F | WEIGHT: 122.6 LBS | HEIGHT: 62 IN

## 2024-10-10 DIAGNOSIS — R51.9 NONINTRACTABLE HEADACHE, UNSPECIFIED CHRONICITY PATTERN, UNSPECIFIED HEADACHE TYPE: ICD-10-CM

## 2024-10-10 DIAGNOSIS — R42 DIZZINESS: ICD-10-CM

## 2024-10-10 DIAGNOSIS — R11.0 NAUSEA: ICD-10-CM

## 2024-10-10 PROCEDURE — 3074F SYST BP LT 130 MM HG: CPT | Performed by: PHYSICIAN ASSISTANT

## 2024-10-10 PROCEDURE — 99214 OFFICE O/P EST MOD 30 MIN: CPT | Performed by: PHYSICIAN ASSISTANT

## 2024-10-10 PROCEDURE — 3078F DIAST BP <80 MM HG: CPT | Performed by: PHYSICIAN ASSISTANT

## 2024-10-10 RX ORDER — DIPHENHYDRAMINE HYDROCHLORIDE 50 MG/ML
25 INJECTION INTRAMUSCULAR; INTRAVENOUS ONCE
Status: DISCONTINUED | OUTPATIENT
Start: 2024-10-10 | End: 2024-10-10

## 2024-10-10 RX ORDER — MECLIZINE HYDROCHLORIDE 25 MG/1
25 TABLET ORAL 3 TIMES DAILY PRN
Qty: 30 TABLET | Refills: 0 | Status: SHIPPED | OUTPATIENT
Start: 2024-10-10

## 2024-10-10 RX ORDER — KETOROLAC TROMETHAMINE 15 MG/ML
15 INJECTION, SOLUTION INTRAMUSCULAR; INTRAVENOUS ONCE
Status: DISCONTINUED | OUTPATIENT
Start: 2024-10-10 | End: 2024-10-10

## 2024-10-10 RX ORDER — DIPHENHYDRAMINE HYDROCHLORIDE 50 MG/ML
25 INJECTION INTRAMUSCULAR; INTRAVENOUS ONCE
Status: COMPLETED | OUTPATIENT
Start: 2024-10-10 | End: 2024-10-10

## 2024-10-10 RX ORDER — PROCHLORPERAZINE EDISYLATE 5 MG/ML
10 INJECTION INTRAMUSCULAR; INTRAVENOUS ONCE
Status: COMPLETED | OUTPATIENT
Start: 2024-10-10 | End: 2024-10-10

## 2024-10-10 RX ORDER — KETOROLAC TROMETHAMINE 15 MG/ML
15 INJECTION, SOLUTION INTRAMUSCULAR; INTRAVENOUS ONCE
Status: COMPLETED | OUTPATIENT
Start: 2024-10-10 | End: 2024-10-10

## 2024-10-10 RX ADMIN — DIPHENHYDRAMINE HYDROCHLORIDE 25 MG: 50 INJECTION INTRAMUSCULAR; INTRAVENOUS at 20:29

## 2024-10-10 RX ADMIN — PROCHLORPERAZINE EDISYLATE 10 MG: 5 INJECTION INTRAMUSCULAR; INTRAVENOUS at 20:35

## 2024-10-10 RX ADMIN — KETOROLAC TROMETHAMINE 15 MG: 15 INJECTION, SOLUTION INTRAMUSCULAR; INTRAVENOUS at 20:30

## 2024-10-14 ASSESSMENT — ENCOUNTER SYMPTOMS
HEADACHES: 1
ABDOMINAL PAIN: 0
DIARRHEA: 0
FEVER: 0
VOMITING: 1
CHILLS: 0
MYALGIAS: 0
BLOOD IN STOOL: 0
NAUSEA: 1
DIZZINESS: 1
DOUBLE VISION: 0
CONSTIPATION: 0
BLURRED VISION: 0

## 2025-02-05 ENCOUNTER — HOSPITAL ENCOUNTER (OUTPATIENT)
Dept: RADIOLOGY | Facility: MEDICAL CENTER | Age: 35
End: 2025-02-05
Payer: COMMERCIAL

## 2025-02-05 DIAGNOSIS — N91.2 CESSATION OF MENSES: ICD-10-CM
